# Patient Record
Sex: FEMALE | Race: WHITE | Employment: UNEMPLOYED | URBAN - METROPOLITAN AREA
[De-identification: names, ages, dates, MRNs, and addresses within clinical notes are randomized per-mention and may not be internally consistent; named-entity substitution may affect disease eponyms.]

---

## 2017-09-18 ENCOUNTER — OFFICE VISIT (OUTPATIENT)
Dept: ENDOCRINOLOGY | Age: 42
End: 2017-09-18

## 2017-09-18 VITALS
RESPIRATION RATE: 16 BRPM | DIASTOLIC BLOOD PRESSURE: 75 MMHG | HEIGHT: 68 IN | WEIGHT: 183 LBS | OXYGEN SATURATION: 98 % | SYSTOLIC BLOOD PRESSURE: 107 MMHG | HEART RATE: 64 BPM | BODY MASS INDEX: 27.74 KG/M2

## 2017-09-18 DIAGNOSIS — E06.3 HYPOTHYROIDISM DUE TO HASHIMOTO'S THYROIDITIS: Primary | ICD-10-CM

## 2017-09-18 DIAGNOSIS — E16.2 HYPOGLYCEMIA, UNSPECIFIED: ICD-10-CM

## 2017-09-18 DIAGNOSIS — E03.8 HYPOTHYROIDISM DUE TO HASHIMOTO'S THYROIDITIS: Primary | ICD-10-CM

## 2017-09-18 RX ORDER — DULOXETIN HYDROCHLORIDE 60 MG/1
CAPSULE, DELAYED RELEASE ORAL
Refills: 3 | COMMUNITY
Start: 2017-08-08

## 2017-09-18 RX ORDER — LORAZEPAM 0.5 MG/1
TABLET ORAL
Refills: 2 | COMMUNITY
Start: 2017-07-18

## 2017-09-18 RX ORDER — LEVOTHYROXINE SODIUM 75 UG/1
TABLET ORAL
Refills: 3 | COMMUNITY
Start: 2017-06-19 | End: 2017-09-19 | Stop reason: DRUGHIGH

## 2017-09-18 NOTE — MR AVS SNAPSHOT
Visit Information Date & Time Provider Department Dept. Phone Encounter #  
 9/18/2017  9:50 AM Sharon Allen MD [facility] [phone] [id number] Upcoming Health Maintenance Date Due  
 PAP AKA CERVICAL CYTOLOGY 1/21/2016 INFLUENZA AGE 9 TO ADULT 8/1/2017 DTaP/Tdap/Td series (2 - Td) 12/24/2022 Allergies as of 9/18/2017  Review Complete On: 9/18/2017 By: Sharon Allen MD  
  
 Severity Noted Reaction Type Reactions Shellfish Derived  09/18/2017    Hives Current Immunizations  Reviewed on 11/7/2013 Name Date Influenza Vaccine PF 11/7/2013 11:00 AM  
 Tdap 12/24/2012 Not reviewed this visit You Were Diagnosed With   
  
 Codes Comments Hypothyroidism due to Hashimoto's thyroiditis    -  Primary ICD-10-CM: E03.8, E06.3 ICD-9-CM: 244.8, 245.2 Hypoglycemia, unspecified     ICD-10-CM: E16.2 ICD-9-CM: 251.2 Vitals BP Pulse Resp Height(growth percentile) Weight(growth percentile) LMP  
 107/75 64 16 5' 7.5\" (1.715 m) 183 lb (83 kg) 09/14/2017 SpO2 BMI OB Status Smoking Status 98% 28.24 kg/m2 Having regular periods Never Smoker Vitals History BMI and BSA Data Body Mass Index Body Surface Area  
 28.24 kg/m 2 1.99 m 2 Preferred Pharmacy Pharmacy Name Phone [id number] [place], [address] [facility] [phone] Your Updated Medication List  
  
   
This list is accurate as of: 9/18/17 10:37 AM.  Always use your most recent med list.  
  
  
  
  
 DULoxetine 60 mg capsule Commonly known as:  CYMBALTA TAKE 1 TABLET BY MOUTH EVERY MORNING  
  
 EPINEPHrine 0.3 mg/0.3 mL injection Commonly known as:  EPIPEN  
0.3 mL by IntraMUSCular route once as needed for 1 dose. LEVOXYL 75 mcg tablet Generic drug:  levothyroxine TAKE 1 TABLET BY MOUTH EVERY DAY  
  
 LEXAPRO 5 mg tablet Generic drug:  escitalopram oxalate Take 7.5 mg by mouth daily. LORazepam 0.5 mg tablet Commonly known as:  ATIVAN  
TAKE 1 TABLET BY MOUTH EVERY DAY AS NEEDED FOR ANXIETY/SLEEP  
  
 PARAGARD T 380A Iud  
Generic drug:  Intrauterine Device (IUD) by IntraUTERine route. We Performed the Following HEMOGLOBIN A1C WITH EAG [13745 CPT(R)] T3, FREE [id number] CPT(R)] TSH AND FREE T4 [41621 CPT(R)] Introducing [facility] [place] [facility]! Lashaun Bradley introduces [employer] patient portal. Now you can access parts of your medical record, email your doctor's office, and request medication refills online. 1. In your internet browser, go to [email or web address] 2. Click on the First Time User? Click Here link in the Sign In box. You will see the New Member Sign Up page. 3. Enter your [facility] [employer] [facility] Access Code exactly as it appears below. You will not need to use this code after youve completed the sign-up process. If you do not sign up before the expiration date, you must request a new code. · [facility] [employer] [facility] Access Code: [email or web address] Expires: 12/17/2017 10:37 AM 
 
4. Enter the last four digits of your Social Security Number (xxxx) and Date of Birth (mm/dd/yyyy) as indicated and click Submit. You will be taken to the next sign-up page. 5. Create a [facility] [employer] [facility] ID. This will be your [facility] [employer] [facility] login ID and cannot be changed, so think of one that is secure and easy to remember. 6. Create a [facility] [employer] [facility] password. You can change your password at any time. 7. Enter your Password Reset Question and Answer. This can be used at a later time if you forget your password. 8. Enter your e-mail address. You will receive e-mail notification when new information is available in [address] 9. Click Sign Up. You can now view and download portions of your medical record. 10. Click the Download Summary menu link to download a portable copy of your medical information.  
 
If you have questions, please visit the Frequently Asked Questions section of the [employer]. Remember, MyChart is NOT to be used for urgent needs. For medical emergencies, dial 911. Now available from your iPhone and Android! Please provide this summary of care documentation to your next provider. Your primary care clinician is listed as Romeo Perez. If you have any questions after today's visit, please call [phone].

## 2017-09-18 NOTE — PROGRESS NOTES
Endocrinology Visit    CC: hypothyroidism    Referring provider: Lu Carpenter MD     History of present illness:  Pia Tillman is a pleasant 35 y.o. female here for hypothyroidism due to Hashimoto's. She previously followed by Dr Elroy Middleton. Was first diagnosed with post-partum thyroiditis and thyroid function returned to normal afterward. However about 2 years ago she was diagnosed with overt hypothyroidism and was started on levothyroxine. She was started at a dose of 50 mcg daily and this was increased to 75 mcg daily. She feels this dose may still not be enough as she has intermittent fatigue, cold intolerance, and weight gain of about 10 lbs. Baseline weight is around 170 and she is 183 lbs today despite eating a healthy diet and exercising 5 days/week. She currently takes brand name Levoxyl 75 mcg daily. She  takes this correctly on an empty stomach, first thing in the morning waiting 30-60 minutes for food and 2-4 hours for multivitamins. She denies racing heart, tremor, palpitations, weight changes, constipation, diarrhea or energy changes. Feels that her nails are more brittle. She does have a h/o IBS but this has improved since starting Cymbalta. Cycles are regular. She has a history of borderline gestational diabetes and a family history of diabetes. Occasionally has symptoms of hypoglycemia (last one was a couple hours after eating a bowl of cereal). She did not have these symptoms when she followed a low-carb diet.     ROS: see HPI for pertinent positives and negatives, otherwise a 10 system review is negative    Problem list:  Patient Active Problem List   Diagnosis Code    Anxiety state, unspecified F41.1    IBS (irritable bowel syndrome) K58.9       Medical history:  Past Medical History:   Diagnosis Date    Anxiety state, unspecified     Hypothyroidism     IBS (irritable bowel syndrome)        Past surgical history:  Past Surgical History:   Procedure Laterality Date    HX GYN  2004    D&C Medications:    Current Outpatient Prescriptions:     DULoxetine (CYMBALTA) 60 mg capsule, TAKE 1 TABLET BY MOUTH EVERY MORNING, Disp: , Rfl: 3    LORazepam (ATIVAN) 0.5 mg tablet, TAKE 1 TABLET BY MOUTH EVERY DAY AS NEEDED FOR ANXIETY/SLEEP, Disp: , Rfl: 2    LEVOXYL 75 mcg tablet, TAKE 1 TABLET BY MOUTH EVERY DAY, Disp: , Rfl: 3    escitalopram (LEXAPRO) 5 mg tablet, Take 7.5 mg by mouth daily. , Disp: , Rfl:     EPINEPHrine (EPIPEN) 0.3 mg/0.3 mL (1:1,000) injection, 0.3 mL by IntraMUSCular route once as needed for 1 dose., Disp: 2 Each, Rfl: 0    Intrauterine Device, IUD, (PARAGARD T 380A) IUD, by IntraUTERine route., Disp: , Rfl:     Allergies: Allergies   Allergen Reactions    Shellfish Derived Hives       Social History:  Social History     Social History    Marital status:      Spouse name: N/A    Number of children: N/A    Years of education: N/A     Occupational History    homemaker      Social History Main Topics    Smoking status: Never Smoker    Smokeless tobacco: Never Used    Alcohol use 2.5 oz/week     5 Glasses of wine per week    Drug use: No    Sexual activity: Yes     Birth control/ protection: IUD     Other Topics Concern    Exercise Yes     Social History Narrative       Family History:  Family History   Problem Relation Age of Onset    Thyroid Disease Mother     Diabetes Maternal Grandmother        Physical Exam:  Visit Vitals    /75    Pulse 64    Resp 16    Ht 5' 7.5\" (1.715 m)    Wt 183 lb (83 kg)    LMP 09/14/2017    SpO2 98%    BMI 28.24 kg/m2     Gen: NAD  Heent: mucous membranes moist, no lid lag, stare or exophthalmos. No scleral or conjunctival injection. Extra ocular muscles intact . Thyroid: moves well with swallowing, normal size, normal texture, no masses appreciated  Heme/lymph: no cervical, supraclavicular or submandibular lymphadenopathy is appreciated.   Pulmonary: clear to auscultation bilaterally, no wheezes/rhonchi or rales  Cardiovascular: regular rate and rhythm, no murmurs, rubs or gallops, good distal pulses  Extremities: no clubbing, cyanosis or edema. No onocholysis   Neuro: no tremor of outstretched hands, reflexes are normal with normal relaxation phase. Normal gait, normal concentration  Skin: normal texture, warm and dry   Psyche: normal affect with good insight into her medical conditions    Pertinent lab review: There are no results available in [facility]. Pertinent lab results from outside records are transcribed in HPI and scanned into the medical record. Assessment and plan:  Patient is a pleasant 35 y.o. female here for hypothyroidism. She clinically appears euthyroid by exam on current LT4 dose of 75 mcg daily, however she has some signs/sx suggestive of under-replacement. To further assess thyroid hormone economy, we will check a TSH, Free T4, and FT3 today. Plan to adjust LT4 dose as needed to achieve a TSH around 1-2. Will also check an A1c given her sx of hypoglycemia - advised a low-carb diet to help with blood sugar stability and weight loss. I spent 30 minutes with the patient today and > 50% of the time was spent counseling the patient about hypothyroidism: its etiology, clinical manifestations, diagnosis, and management. She will return in 4 months time. Thank you for the opportunity to partake in this patients care.   Donis Silva MD  [facility]  [facility]

## 2017-09-19 LAB
EST. AVERAGE GLUCOSE BLD GHB EST-MCNC: 91 MG/DL
HBA1C MFR BLD: 4.8 % (ref 4.8–5.6)
T3FREE SERPL-MCNC: 2.2 PG/ML (ref 2–4.4)
T4 FREE SERPL-MCNC: 1.18 NG/DL (ref 0.82–1.77)
TSH SERPL DL<=0.005 MIU/L-ACNC: 2.41 UIU/ML (ref 0.45–4.5)

## 2017-09-19 RX ORDER — LEVOTHYROXINE SODIUM 88 UG/1
88 TABLET ORAL
Qty: 30 TAB | Refills: 5 | Status: SHIPPED | OUTPATIENT
Start: 2017-09-19 | End: 2018-02-19 | Stop reason: SDUPTHER

## 2018-02-17 LAB
T4 FREE SERPL-MCNC: 1.21 NG/DL (ref 0.82–1.77)
TSH SERPL DL<=0.005 MIU/L-ACNC: 1.45 UIU/ML (ref 0.45–4.5)

## 2018-02-19 ENCOUNTER — OFFICE VISIT (OUTPATIENT)
Dept: ENDOCRINOLOGY | Age: 43
End: 2018-02-19

## 2018-02-19 VITALS
SYSTOLIC BLOOD PRESSURE: 104 MMHG | HEIGHT: 67 IN | BODY MASS INDEX: 30.13 KG/M2 | RESPIRATION RATE: 16 BRPM | WEIGHT: 192 LBS | HEART RATE: 73 BPM | DIASTOLIC BLOOD PRESSURE: 77 MMHG

## 2018-02-19 DIAGNOSIS — E03.8 HYPOTHYROIDISM DUE TO HASHIMOTO'S THYROIDITIS: Primary | ICD-10-CM

## 2018-02-19 DIAGNOSIS — E66.9 OBESITY, CLASS I, BMI 30-34.9: ICD-10-CM

## 2018-02-19 DIAGNOSIS — E06.3 HYPOTHYROIDISM DUE TO HASHIMOTO'S THYROIDITIS: Primary | ICD-10-CM

## 2018-02-19 RX ORDER — LEVOTHYROXINE SODIUM 88 UG/1
88 TABLET ORAL
Qty: 30 TAB | Refills: 5 | Status: SHIPPED | OUTPATIENT
Start: 2018-02-19

## 2018-02-19 RX ORDER — LIOTHYRONINE SODIUM 5 UG/1
5 TABLET ORAL DAILY
Qty: 30 TAB | Refills: 5 | Status: SHIPPED | OUTPATIENT
Start: 2018-02-19 | End: 2018-06-14 | Stop reason: SDUPTHER

## 2018-02-19 RX ORDER — DULOXETIN HYDROCHLORIDE 30 MG/1
CAPSULE, DELAYED RELEASE ORAL
Refills: 2 | COMMUNITY
Start: 2018-01-09

## 2018-02-19 NOTE — PATIENT INSTRUCTIONS
[email or web address]    Circuit training    Cycling    ==================================================    I recommend researching a low-carbohydrate diet as this way of eating can help improve your blood sugars and also help you lose weight. It can also help decrease your needs for diabetes medications including insulin. There are different types of low-carb diets:  - Strict ketogenic/Atkins - typically less than 20-30 grams of carbs/day  - Moderate low-carb/South Beach or Paleo - typically less than 60-75 grams of carbs/day    ==================================================================  An outline of the basics of a moderate low-carb diet:     AIM FOR LESS THAN 20-30 GRAMS OF NET CARBS PER MEAL  Net carbs = total carbs (g) - fiber (g)   Read food labels! Avoid food with added sugar or anything with more than 5g sugar per serving. Focus on eating mostly protein (meat, poultry, fish, shellfish, eggs), healthy fats (avocados, nuts, cheese, olive or coconut oil) and non-starchy vegetables (greens, carrots, tomatoes, bell peppers, broccoli, brussels sprouts, green beans, etc). If you fill yourself up with these foods, you won't even want the carbs. Minimize your fruit intake as much as possible, no more than one serving per day.  When you do eat fruit, choose lower sugar options like fresh berries.     BREAKFAST: whole eggs, veggies, omelet, plain yogurt, marks, sausage, protein shake or low-carb protein bar (Atkins, Quest, etc)  LUNCH: salad with meat/poultry, veggies, cheese, nuts; low-carb wrap with veggies and meat, soup with meat/veggies  DINNER: any type of meat/poultry or seafood (without breading), non-starchy vegetables, carb substitutes like cauliflower rice or zucchini noodles     SNACK OPTIONS: cheese (string cheese or individually wrapped snack size cheese), carrots and celery with Ranch or blue cheese dressing, nuts (almonds, pistachios, walnuts, etc), meat (snack size salami, ham, or turkey), pork rinds, jerkey    SWEETS (in moderation): dark chocolate (greater than 75% cocoa), low-sugar ice cream (Halo Top, Breyers or Lucio's No Sugar Added)    BEVERAGES: water, water, water  Other options: unsweet tea (okay to add a pack of Splenda or Stevia if needed), sparkling water without calories (ex: [employer], Tiffanie, etc), coffee (unsweetened creamer is fine)  Alcohol: (always in moderation) - lower carb options include red or white wine and champagne (the drier, the better); light beers like [employer] Ultra; some liquors (just avoid the sweet mixers like juices and sodas)

## 2018-02-19 NOTE — PROGRESS NOTES
Endocrinology Visit    CC: hypothyroidism    History of present illness:  Bowen Zarate is a pleasant 38 y.o. female here for f/u of hypothyroidism due to Hashimoto's. She previously followed by Dr Crystal Andrew. Was first diagnosed with post-partum thyroiditis and thyroid function returned to normal afterward. However about 2 years ago she was diagnosed with overt hypothyroidism and was started on levothyroxine. She was started at a dose of 50 mcg daily and this was increased to 75 mcg daily. I saw her in initial consultation in Sept 2017 at which time I recommended a dose increase to 88 mcg daily. In the interim, she reports no major change in symptoms. Weight has increased 9 lbs since her last visit which frustrates her because she has been following a 1200 calorie diet. Baseline weight is around 170 and she was 183 lbs at her last visit. She currently takes brand name Unithroid 88 mcg daily. She  takes this correctly on an empty stomach, first thing in the morning waiting 30-60 minutes for food and 2-4 hours for multivitamins. She denies racing heart, tremor, palpitations, weight changes, constipation, diarrhea or energy changes. Has an IUD for contraception. She also takes Cymbalta. She was able to lose weight with a strict low-carb diet in the past but she dislikes eating a lot of meat/animal protein. Walks or does yoga for exercise. Now following a 1200 calorie diet. Eats 3 meals/day and typically snacks in between meals (light yogurt, fruit, smart popcorn, cheese).  24 hour recall from yesterday:   Breakfast: Nutrigrain low-fat waffle with 1 tbs peanut butter and 1/4 a banana  Snack: cheese stick  Lunch: 8 wheat thins, goat cheese, kaylah slaw, 1/2 apple  Snack: yogurt (Light n'Fit)  Dinner: soup with rice, apples, carrots    ROS: see HPI for pertinent positives and negatives, otherwise a 7 system review is negative    Problem list:  Patient Active Problem List   Diagnosis Code    Anxiety state, unspecified F41.1    IBS (irritable bowel syndrome) K58.9    Hypothyroidism due to Hashimoto's thyroiditis E03.8, E06.3    Hypoglycemia, unspecified E16.2       Medical history:  Past Medical History:   Diagnosis Date    Anxiety state, unspecified     Hypothyroidism     IBS (irritable bowel syndrome)        Past surgical history:  Past Surgical History:   Procedure Laterality Date    HX GYN  2004    D&C       Medications:    Current Outpatient Prescriptions:     DULoxetine (CYMBALTA) 30 mg capsule, TAKE ONE CAPSULE BY MOUTH EVERY MORNING, Disp: , Rfl: 2    UNITHROID 88 mcg tablet, Take 1 Tab by mouth Daily (before breakfast). , Disp: 30 Tab, Rfl: 5    DULoxetine (CYMBALTA) 60 mg capsule, TAKE 1 TABLET BY MOUTH EVERY MORNING, Disp: , Rfl: 3    LORazepam (ATIVAN) 0.5 mg tablet, TAKE 1 TABLET BY MOUTH EVERY DAY AS NEEDED FOR ANXIETY/SLEEP, Disp: , Rfl: 2    EPINEPHrine (EPIPEN) 0.3 mg/0.3 mL (1:1,000) injection, 0.3 mL by IntraMUSCular route once as needed for 1 dose., Disp: 2 Each, Rfl: 0    FLUVIRIN 0415-2198, PF, syrg injection, ADM 0.5ML IM UTD, Disp: , Rfl: 0    escitalopram (LEXAPRO) 5 mg tablet, Take 7.5 mg by mouth daily. , Disp: , Rfl:     Intrauterine Device, IUD, (PARAGARD T 380A) IUD, by IntraUTERine route., Disp: , Rfl:     Allergies:   Allergies   Allergen Reactions    Shellfish Derived Hives       Social History:  Social History     Social History    Marital status:      Spouse name: N/A    Number of children: N/A    Years of education: N/A     Occupational History    homemaker      Social History Main Topics    Smoking status: Never Smoker    Smokeless tobacco: Never Used    Alcohol use 2.5 oz/week     5 Glasses of wine per week    Drug use: No    Sexual activity: Yes     Birth control/ protection: IUD     Other Topics Concern    Exercise Yes     Social History Narrative       Family History:  Family History   Problem Relation Age of Onset    Thyroid Disease Mother     Diabetes Maternal Grandmother        Physical Exam:  Visit Vitals    /77    Pulse 73    Resp 16    Ht 5' 7\" (1.702 m)    Wt 192 lb (87.1 kg)    BMI 30.07 kg/m2      Gen: NAD  Heent: mucous membranes moist, no lid lag, stare or exophthalmos. No scleral or conjunctival injection. Extra ocular muscles intact . Thyroid: moves well with swallowing, normal size, normal texture, no masses appreciated  Heme/lymph: no cervical, supraclavicular or submandibular lymphadenopathy is appreciated. Pulmonary: clear to auscultation bilaterally, no wheezes/rhonchi or rales  Cardiovascular: regular rate and rhythm, no murmurs, rubs or gallops, good distal pulses  Extremities: no clubbing, cyanosis or edema. No onocholysis   Neuro: no tremor of outstretched hands, reflexes are normal with normal relaxation phase. Normal gait, normal concentration  Skin: normal texture, warm and dry   Psyche: normal affect with good insight into her medical conditions    Pertinent lab review:     Component      Latest Ref Rng & Units 2/16/2018 9/18/2017 9/18/2017           1:30 PM 11:09 AM 11:09 AM   TSH      0.450 - 4.500 uIU/mL 1.450  2.410   T4, Free      0.82 - 1.77 ng/dL 1.21  1.18   Triiodothyronine (T3), free      2.0 - 4.4 pg/mL  2.2      Lab Results   Component Value Date/Time    Hemoglobin A1c 4.8 09/18/2017 11:09 AM      Assessment and plan:  Patient is a pleasant 38 y.o. female here for hypothyroidism. She clinically appears euthyroid by exam on current LT4 dose of 88 mcg daily and TSH has improved, however she still has some signs/sx suggestive of under-replacement (namely weight gain). Will trial addition of low-dose T3 and repeat a TSH, Free T4, and FT3 in 2 months. We also discussed strategies for weight loss - advised a moderate low-carb diet and increase in resistance training.      I spent 25 minutes with the patient today and > 50% of the time was spent counseling the patient about hypothyroidism: its etiology, clinical manifestations, diagnosis, and management. She will return in 4 months time with labs done prior. Thank you for the opportunity to partake in this patients care.   José Miguel Reyes MD  [facility]  [facility]

## 2018-02-19 NOTE — MR AVS SNAPSHOT
[address] 
[phone] Patient: Noe Bone MRN: [id number] :1975 Visit Information Date & Time Provider Department Dept. Phone Encounter #  
 2018  3:10 PM Robin Turner, [facility] [id number] Upcoming Health Maintenance Date Due  
 PAP AKA CERVICAL CYTOLOGY 2016 DTaP/Tdap/Td series (2 - Td) 2022 Allergies as of 2018  Review Complete On: 2018 By: Matias Hickey Severity Noted Reaction Type Reactions Shellfish Derived  2017    Hives Current Immunizations  Reviewed on 2013 Name Date Influenza Vaccine PF 2013 11:00 AM  
 Tdap 2012 Not reviewed this visit You Were Diagnosed With   
  
 Codes Comments Hypothyroidism due to Hashimoto's thyroiditis    -  Primary ICD-10-CM: E03.8, E06.3 ICD-9-CM: 244.8, 245.2 Vitals BP Pulse Resp Height(growth percentile) Weight(growth percentile) BMI  
 104/77 73 16 5' 7\" (1.702 m) 192 lb (87.1 kg) 30.07 kg/m2 OB Status Smoking Status Having regular periods Never Smoker Vitals History BMI and BSA Data Body Mass Index Body Surface Area 30.07 kg/m 2 2.03 m 2 Preferred Pharmacy Pharmacy Name Phone [facility], [address] [place] [address] Your Updated Medication List  
  
   
This list is accurate as of: 18  3:53 PM.  Always use your most recent med list.  
  
  
  
  
 * DULoxetine 60 mg capsule Commonly known as:  CYMBALTA TAKE 1 TABLET BY MOUTH EVERY MORNING  
  
 * DULoxetine 30 mg capsule Commonly known as:  CYMBALTA TAKE ONE CAPSULE BY MOUTH EVERY MORNING  
  
 EPINEPHrine 0.3 mg/0.3 mL injection Commonly known as:  EPIPEN  
0.3 mL by IntraMUSCular route once as needed for 1 dose. FLUVIRIN 1538-1884 (PF) Syrg injection Generic drug:  influenza vaccine 2017-18 (4 yrs+)(PF)  
ADM 0.5ML IM UTD LEXAPRO 5 mg tablet Generic drug:  escitalopram oxalate Take 7.5 mg by mouth daily. liothyronine 5 mcg tablet Commonly known as:  CYTOMEL Take 1 Tab by mouth daily. LORazepam 0.5 mg tablet Commonly known as:  ATIVAN  
TAKE 1 TABLET BY MOUTH EVERY DAY AS NEEDED FOR ANXIETY/SLEEP  
  
 PARAGARD T 380A Iud  
Generic drug:  Intrauterine Device (IUD) by IntraUTERine route. UNITHROID 88 mcg tablet Generic drug:  levothyroxine Take 1 Tab by mouth Daily (before breakfast). * Notice: This list has 2 medication(s) that are the same as other medications prescribed for you. Read the directions carefully, and ask your doctor or other care provider to review them with you. Prescriptions Sent to Pharmacy Refills  
 liothyronine (CYTOMEL) 5 mcg tablet 5 Sig: Take 1 Tab by mouth daily. Class: Normal  
 Pharmacy: [address] [place] [address] Ph #: [phone] Route: Oral  
 UNITHROID 88 mcg tablet 5 Sig: Take 1 Tab by mouth Daily (before breakfast). Class: Normal  
 Pharmacy: [address] [place] [address] Ph #: [phone] Route: Oral  
  
We Performed the Following T3, FREE [id number] CPT(R)] TSH AND FREE T4 [21644 CPT(R)] To-Do List   
 Around 08/19/2018 Lab:  T3, FREE Around 08/19/2018 Lab:  TSH AND FREE T4 Patient Instructions   
[email or web address] 
 
Circuit training Cycling 
 
================================================== 
 
I recommend researching a low-carbohydrate diet as this way of eating can help improve your blood sugars and also help you lose weight. It can also help decrease your needs for diabetes medications including insulin.  There are different types of low-carb diets: 
- Strict ketogenic/Atkins - typically less than 20-30 grams of carbs/day - Moderate low-carb/[place] or Paleo - typically less than 60-75 grams of carbs/day 
 
================================================================== An outline of the basics of a moderate low-carb diet: 
  
AIM FOR LESS THAN 20-30 GRAMS OF NET CARBS PER MEAL Net carbs = total carbs (g) - fiber (g) Read food labels! Avoid food with added sugar or anything with more than 5g sugar per serving. Focus on eating mostly protein (meat, poultry, fish, shellfish, eggs), healthy fats (avocados, nuts, cheese, olive or coconut oil) and non-starchy vegetables (greens, carrots, tomatoes, bell peppers, broccoli, brussels sprouts, green beans, etc). If you fill yourself up with these foods, you won't even want the carbs. Minimize your fruit intake as much as possible, no more than one serving per day. When you do eat fruit, choose lower sugar options like fresh berries. 
  
BREAKFAST: whole eggs, veggies, omelet, plain yogurt, marks, sausage, protein shake or low-carb protein bar (Atkins, Quest, etc) LUNCH: salad with meat/poultry, veggies, cheese, nuts; low-carb wrap with veggies and meat, soup with meat/veggies DINNER: any type of meat/poultry or seafood (without breading), non-starchy vegetables, carb substitutes like cauliflower rice or zucchini noodles 
  
SNACK OPTIONS: cheese (string cheese or individually wrapped snack size cheese), carrots and celery with Ranch or blue cheese dressing, nuts (almonds, pistachios, walnuts, etc), meat (snack size salami, ham, or turkey), pork rinds, Mendel Romance SWEETS (in moderation): dark chocolate (greater than 75% cocoa), low-sugar ice cream (Halo Top, Breyers or Lucio's No Sugar Added) BEVERAGES: water, water, water Other options: unsweet tea (okay to add a pack of Splenda or Stevia if needed), sparkling water without calories (ex: Texie Martyr, etc), coffee (unsweetened creamer is fine) Alcohol: (always in moderation) - lower carb options include red or white wine and champagne (the drier, the better); light beers like Michelob Ultra; some liquors (just avoid the sweet mixers like juices and sodas) Introducing [facility]! Dear Tori Garcia: 
Thank you for requesting a Gust account. Our records indicate that you already have an active Gust account. You can access your account anytime at [email or web address] Did you know that you can access your hospital and ER discharge instructions at any time in Gust? You can also review all of your test results from your hospital stay or ER visit. Additional Information If you have questions, please visit the Frequently Asked Questions section of the Gust website at [email or web address] Remember, Gust is NOT to be used for urgent needs. For medical emergencies, dial 911. Now available from your iPhone and Android! Please provide this summary of care documentation to your next provider. Your primary care clinician is listed as Jacquelyn De La Paz. If you have any questions after today's visit, please call [phone].

## 2018-02-19 NOTE — PROGRESS NOTES
Lab Results   Component Value Date/Time    TSH 1.450 02/16/2018 01:30 PM    Triiodothyronine (T3), free 2.2 09/18/2017 11:09 AM    T4, Free 1.21 02/16/2018 01:30 PM

## 2018-06-13 LAB
T3FREE SERPL-MCNC: 3.1 PG/ML (ref 2–4.4)
T4 FREE SERPL-MCNC: 1.2 NG/DL (ref 0.82–1.77)
TSH SERPL DL<=0.005 MIU/L-ACNC: 2.29 UIU/ML (ref 0.45–4.5)

## 2018-06-14 RX ORDER — LIOTHYRONINE SODIUM 5 UG/1
10 TABLET ORAL DAILY
Qty: 60 TAB | Refills: 2 | Status: SHIPPED | OUTPATIENT
Start: 2018-06-14

## 2018-08-19 DIAGNOSIS — E06.3 HYPOTHYROIDISM DUE TO HASHIMOTO'S THYROIDITIS: ICD-10-CM

## 2018-08-19 DIAGNOSIS — E03.8 HYPOTHYROIDISM DUE TO HASHIMOTO'S THYROIDITIS: ICD-10-CM

## 2022-03-18 PROBLEM — E16.2 HYPOGLYCEMIA, UNSPECIFIED: Status: ACTIVE | Noted: 2017-09-18

## 2022-03-18 PROBLEM — E03.8 HYPOTHYROIDISM DUE TO HASHIMOTO'S THYROIDITIS: Status: ACTIVE | Noted: 2017-09-18

## 2022-03-18 PROBLEM — E06.3 HYPOTHYROIDISM DUE TO HASHIMOTO'S THYROIDITIS: Status: ACTIVE | Noted: 2017-09-18

## 2022-03-19 PROBLEM — E66.9 OBESITY, CLASS I, BMI 30-34.9: Status: ACTIVE | Noted: 2018-02-19

## 2022-03-19 PROBLEM — E66.811 OBESITY, CLASS I, BMI 30-34.9: Status: ACTIVE | Noted: 2018-02-19

## 2022-07-11 ENCOUNTER — HOSPITAL ENCOUNTER (EMERGENCY)
Age: 47
Discharge: STILL ADMITTED OR ADMINISTRATIVE | End: 2022-07-11
Attending: EMERGENCY MEDICINE

## 2023-02-27 ENCOUNTER — TRANSCRIBE ORDER (OUTPATIENT)
Dept: SCHEDULING | Age: 48
End: 2023-02-27

## 2023-02-27 DIAGNOSIS — R10.9 UNSPECIFIED ABDOMINAL PAIN: Primary | ICD-10-CM

## 2023-10-04 ENCOUNTER — TRANSCRIBE ORDERS (OUTPATIENT)
Facility: HOSPITAL | Age: 48
End: 2023-10-04

## 2023-10-04 DIAGNOSIS — E78.00 PURE HYPERCHOLESTEROLEMIA: Primary | ICD-10-CM

## 2023-10-05 ENCOUNTER — TRANSCRIBE ORDERS (OUTPATIENT)
Facility: HOSPITAL | Age: 48
End: 2023-10-05

## 2023-10-05 DIAGNOSIS — R10.9 ABDOMINAL PAIN, UNSPECIFIED ABDOMINAL LOCATION: Primary | ICD-10-CM

## 2023-10-12 ENCOUNTER — HOSPITAL ENCOUNTER (OUTPATIENT)
Age: 48
Discharge: HOME | End: 2023-10-12

## 2023-10-12 ENCOUNTER — HOSPITAL ENCOUNTER (OUTPATIENT)
Age: 48
End: 2023-10-12

## 2023-10-12 DIAGNOSIS — E78.00 PURE HYPERCHOLESTEROLEMIA: ICD-10-CM

## 2023-10-12 DIAGNOSIS — R10.9 ABDOMINAL PAIN, UNSPECIFIED ABDOMINAL LOCATION: ICD-10-CM

## 2023-10-12 LAB
VAS LEFT ICA/CCA PSV: 1.4
VAS RIGHT ICA/CCA PSV: 1.1

## 2023-10-12 PROCEDURE — 76700 US EXAM ABDOM COMPLETE: CPT

## 2023-10-12 PROCEDURE — 93880 EXTRACRANIAL BILAT STUDY: CPT

## 2025-01-13 ENCOUNTER — HOSPITAL ENCOUNTER (OUTPATIENT)
Age: 50
Discharge: HOME | End: 2025-01-16
Payer: COMMERCIAL

## 2025-01-13 DIAGNOSIS — R10.13 ABDOMINAL PAIN, EPIGASTRIC: ICD-10-CM

## 2025-01-13 PROCEDURE — 76700 US EXAM ABDOM COMPLETE: CPT
